# Patient Record
Sex: MALE | Race: OTHER | Employment: FULL TIME | URBAN - METROPOLITAN AREA
[De-identification: names, ages, dates, MRNs, and addresses within clinical notes are randomized per-mention and may not be internally consistent; named-entity substitution may affect disease eponyms.]

---

## 2017-03-24 ENCOUNTER — HOSPITAL ENCOUNTER (OUTPATIENT)
Age: 37
Discharge: HOME | End: 2017-03-24
Attending: EMERGENCY MEDICINE
Payer: COMMERCIAL

## 2017-03-24 VITALS
RESPIRATION RATE: 16 BRPM | OXYGEN SATURATION: 98 % | DIASTOLIC BLOOD PRESSURE: 73 MMHG | TEMPERATURE: 98 F | SYSTOLIC BLOOD PRESSURE: 143 MMHG | BODY MASS INDEX: 25 KG/M2 | HEART RATE: 75 BPM | WEIGHT: 138 LBS

## 2017-03-24 DIAGNOSIS — R42 VERTIGO: Primary | ICD-10-CM

## 2017-03-24 PROCEDURE — 99203 OFFICE O/P NEW LOW 30 MIN: CPT

## 2017-03-24 PROCEDURE — 99204 OFFICE O/P NEW MOD 45 MIN: CPT

## 2017-03-24 RX ORDER — MECLIZINE HYDROCHLORIDE 25 MG/1
25 TABLET ORAL ONCE
Status: COMPLETED | OUTPATIENT
Start: 2017-03-24 | End: 2017-03-24

## 2017-03-24 RX ORDER — MECLIZINE HYDROCHLORIDE 25 MG/1
25 TABLET ORAL 3 TIMES DAILY PRN
Qty: 20 TABLET | Refills: 0 | Status: SHIPPED | OUTPATIENT
Start: 2017-03-24 | End: 2019-11-29 | Stop reason: ALTCHOICE

## 2017-03-24 NOTE — ED INITIAL ASSESSMENT (HPI)
REPORTS DIZZINESS SINCE YESTERDAY. REPORTS ROOM SPINNING SENSATION. REPORTS EMESIS X 1 TODAY.  + NYSTAGMUS. DENIES SHORTNESS OF BREATH/CHEST PAIN.

## 2017-03-24 NOTE — ED PROVIDER NOTES
Patient Seen in: [facility]    History   Patient presents with:  Dizziness (neurologic)    Stated Complaint: Dizzyness    HPI    Patient presents with dizzy sensation. It is worse when he moves his head.   He had some diana well nourished adult lying in bed in no distress. Vital signs noted. Eye:  No scleral icterus. Eyelids appear normal, no lesions. Patient has nystagmus fatigable looking to the right.   No vertical or to the left tympanic membranes no redness no bulging

## 2017-03-30 ENCOUNTER — OFFICE VISIT (OUTPATIENT)
Dept: FAMILY MEDICINE CLINIC | Facility: CLINIC | Age: 37
End: 2017-03-30

## 2017-03-30 VITALS
HEART RATE: 71 BPM | SYSTOLIC BLOOD PRESSURE: 120 MMHG | BODY MASS INDEX: 25 KG/M2 | DIASTOLIC BLOOD PRESSURE: 77 MMHG | WEIGHT: 138 LBS

## 2017-03-30 DIAGNOSIS — H81.11 BENIGN PAROXYSMAL VERTIGO, RIGHT: Primary | ICD-10-CM

## 2017-03-30 PROCEDURE — 99213 OFFICE O/P EST LOW 20 MIN: CPT | Performed by: FAMILY MEDICINE

## 2017-03-30 PROCEDURE — 99212 OFFICE O/P EST SF 10 MIN: CPT | Performed by: FAMILY MEDICINE

## 2017-03-30 RX ORDER — PREDNISONE 20 MG/1
20 TABLET ORAL 2 TIMES DAILY
Qty: 10 TABLET | Refills: 0 | Status: SHIPPED | OUTPATIENT
Start: 2017-03-30 | End: 2017-04-04

## 2017-03-30 NOTE — PROGRESS NOTES
Friday when I woke up I opened my eyes the room was spinning. I went to wash up and it was the same. Went to urgent care. Meclizine didn't really help  \"Now I still have dizziness when I move my head rapidly. \"  The dizziness persists but it is not a

## 2019-11-29 ENCOUNTER — OFFICE VISIT (OUTPATIENT)
Dept: FAMILY MEDICINE CLINIC | Facility: CLINIC | Age: 39
End: 2019-11-29

## 2019-11-29 VITALS
TEMPERATURE: 99 F | SYSTOLIC BLOOD PRESSURE: 134 MMHG | DIASTOLIC BLOOD PRESSURE: 77 MMHG | WEIGHT: 133.81 LBS | HEART RATE: 58 BPM | BODY MASS INDEX: 23.71 KG/M2 | HEIGHT: 63 IN

## 2019-11-29 DIAGNOSIS — L20.9 ATOPIC DERMATITIS, UNSPECIFIED TYPE: Primary | ICD-10-CM

## 2019-11-29 PROCEDURE — 99213 OFFICE O/P EST LOW 20 MIN: CPT | Performed by: PHYSICIAN ASSISTANT

## 2019-11-29 RX ORDER — METHYLPREDNISOLONE 4 MG/1
TABLET ORAL
Qty: 1 KIT | Refills: 0 | Status: SHIPPED | OUTPATIENT
Start: 2019-11-29 | End: 2020-05-21 | Stop reason: ALTCHOICE

## 2019-11-29 RX ORDER — HYDROXYZINE HYDROCHLORIDE 25 MG/1
25 TABLET, FILM COATED ORAL EVERY 8 HOURS PRN
Qty: 30 TABLET | Refills: 0 | Status: SHIPPED | OUTPATIENT
Start: 2019-11-29 | End: 2020-05-21 | Stop reason: ALTCHOICE

## 2019-11-29 NOTE — PROGRESS NOTES
HPI:   Rash   This is a new problem. The current episode started in the past 7 days. The problem is unchanged. The affected locations include the abdomen, left arm and right arm. The rash is characterized by redness and itchiness.  It is unknown if there wa Types: Cigarettes      Smokeless tobacco: Never Used      Tobacco comment: 1 pack a month     Substance and Sexual Activity      Alcohol use:  Yes        Alcohol/week: 5.0 standard drinks        Types: 5 Standard drinks or equivalent per week        Frequen wheezing. Cardiovascular: Negative for chest pain and palpitations. Gastrointestinal: Negative for nausea, vomiting, abdominal pain, diarrhea, constipation, blood in stool and abdominal distention. Musculoskeletal: Negative for joint pain.    Skin: P Discussed plan of care with pt and pt is in agreement. All questions answered. Pt to call with questions or concerns. Encouraged to sign up for My Chart if not already registered.

## 2020-04-02 ENCOUNTER — NURSE TRIAGE (OUTPATIENT)
Dept: OTHER | Age: 40
End: 2020-04-02

## 2020-04-02 DIAGNOSIS — J02.9 SORE THROAT: Primary | ICD-10-CM

## 2020-04-02 PROCEDURE — 99213 OFFICE O/P EST LOW 20 MIN: CPT | Performed by: PHYSICIAN ASSISTANT

## 2020-04-02 NOTE — TELEPHONE ENCOUNTER
Action Requested: Summary for Provider     []  Critical Lab, Recommendations Needed  [] Need Additional Advice  []   FYI    [x]   Need Orders  [] Need Medications Sent to Pharmacy  []  Other     SUMMARY: onset 3-4 days, sore throat pain level 4/10 feels ir

## 2020-04-03 ENCOUNTER — TELEPHONE (OUTPATIENT)
Dept: FAMILY MEDICINE CLINIC | Facility: CLINIC | Age: 40
End: 2020-04-03

## 2020-04-03 NOTE — TELEPHONE ENCOUNTER
Tra Ash PA-C  Em Fm L[employer] Lpn/Cma 1 minute ago (8:48 AM)         Please provide employer note.          Documentation

## 2020-04-03 NOTE — TELEPHONE ENCOUNTER
Virtual/Telephone Check-In    Zulay Garg verbally consents to a Virtual/Telephone Check-In service on 04/03/20. Patient understands and accepts financial responsibility for any deductible, co-insurance and/or co-pays associated with this service.     Dur

## 2020-05-19 ENCOUNTER — NURSE TRIAGE (OUTPATIENT)
Dept: FAMILY MEDICINE CLINIC | Facility: CLINIC | Age: 40
End: 2020-05-19

## 2020-05-19 NOTE — TELEPHONE ENCOUNTER
Action Requested: Summary for Provider     []  Critical Lab, Recommendations Needed  [] Need Additional Advice  []   FYI    []   Need Orders  [] Need Medications Sent to Pharmacy  []  Other     SUMMARY: Per protocol advised : OV within 3 days    Future Philip

## 2020-05-21 ENCOUNTER — OFFICE VISIT (OUTPATIENT)
Dept: FAMILY MEDICINE CLINIC | Facility: CLINIC | Age: 40
End: 2020-05-21

## 2020-05-21 VITALS
RESPIRATION RATE: 16 BRPM | HEIGHT: 63 IN | BODY MASS INDEX: 24.1 KG/M2 | SYSTOLIC BLOOD PRESSURE: 116 MMHG | DIASTOLIC BLOOD PRESSURE: 78 MMHG | HEART RATE: 63 BPM | WEIGHT: 136 LBS

## 2020-05-21 DIAGNOSIS — R07.9 CHEST PAIN, UNSPECIFIED TYPE: Primary | ICD-10-CM

## 2020-05-21 DIAGNOSIS — L65.9 HAIR LOSS: ICD-10-CM

## 2020-05-21 DIAGNOSIS — R53.1 WEAKNESS: ICD-10-CM

## 2020-05-21 PROCEDURE — 3078F DIAST BP <80 MM HG: CPT | Performed by: PHYSICIAN ASSISTANT

## 2020-05-21 PROCEDURE — 3074F SYST BP LT 130 MM HG: CPT | Performed by: PHYSICIAN ASSISTANT

## 2020-05-21 PROCEDURE — 3008F BODY MASS INDEX DOCD: CPT | Performed by: PHYSICIAN ASSISTANT

## 2020-05-21 PROCEDURE — 99213 OFFICE O/P EST LOW 20 MIN: CPT | Performed by: PHYSICIAN ASSISTANT

## 2020-05-21 RX ORDER — FINASTERIDE 5 MG/1
5 TABLET, FILM COATED ORAL DAILY
Qty: 90 TABLET | Refills: 1 | Status: SHIPPED | OUTPATIENT
Start: 2020-05-21 | End: 2020-11-12

## 2020-05-21 NOTE — PROGRESS NOTES
HPI:   HPI  38year-old male is here in the office complaining of intermittent bilateral upper extremities and lower extremities weakness for the past 2 weeks. Patient also complaints of sore throat and chest pain sometimes for the past month.  The pain i use: Yes        Alcohol/week: 5.0 standard drinks        Types: 5 Standard drinks or equivalent per week        Frequency: 2-4 times a month        Drinks per session: 1 or 2        Binge frequency: Never        Comment: beer occasionally      Drug use: No abdominal pain, diarrhea, constipation, blood in stool and abdominal distention. Skin: Positive for hair loss. Negative for rash.   + muscle weakness Bl upper and lower extremities.      05/21/20  1012 05/21/20  1032   BP: 144/86 116/78   Pulse: 63    Res DIFFERENTIAL WITH PLATELET    LIPID PANEL    PSA SCREEN    HEMOGLOBIN A1C    TSH W REFLEX TO FREE T4    VITAMIN D, 25-HYDROXY    EKG 12-LEAD    Weakness        Relevant Orders    ALT (SGPT)    AST (SGOT)    BASIC METABOLIC PANEL (8)    CBC WITH DIFFERENTIA

## 2020-11-12 RX ORDER — FINASTERIDE 5 MG/1
5 TABLET, FILM COATED ORAL DAILY
Qty: 90 TABLET | Refills: 1 | Status: SHIPPED | OUTPATIENT
Start: 2020-11-12 | End: 2021-02-10

## 2021-10-12 ENCOUNTER — OFFICE VISIT (OUTPATIENT)
Dept: FAMILY MEDICINE CLINIC | Facility: CLINIC | Age: 41
End: 2021-10-12

## 2021-10-12 VITALS
HEART RATE: 65 BPM | SYSTOLIC BLOOD PRESSURE: 121 MMHG | DIASTOLIC BLOOD PRESSURE: 72 MMHG | BODY MASS INDEX: 24.8 KG/M2 | HEIGHT: 63 IN | WEIGHT: 140 LBS

## 2021-10-12 DIAGNOSIS — L65.9 HAIR LOSS: Primary | ICD-10-CM

## 2021-10-12 DIAGNOSIS — R42 VERTIGO: ICD-10-CM

## 2021-10-12 PROCEDURE — 3008F BODY MASS INDEX DOCD: CPT | Performed by: PHYSICIAN ASSISTANT

## 2021-10-12 PROCEDURE — 90686 IIV4 VACC NO PRSV 0.5 ML IM: CPT | Performed by: PHYSICIAN ASSISTANT

## 2021-10-12 PROCEDURE — 3074F SYST BP LT 130 MM HG: CPT | Performed by: PHYSICIAN ASSISTANT

## 2021-10-12 PROCEDURE — 90471 IMMUNIZATION ADMIN: CPT | Performed by: PHYSICIAN ASSISTANT

## 2021-10-12 PROCEDURE — 99213 OFFICE O/P EST LOW 20 MIN: CPT | Performed by: PHYSICIAN ASSISTANT

## 2021-10-12 PROCEDURE — 3078F DIAST BP <80 MM HG: CPT | Performed by: PHYSICIAN ASSISTANT

## 2021-10-12 RX ORDER — MECLIZINE HYDROCHLORIDE 25 MG/1
25 TABLET ORAL 3 TIMES DAILY PRN
Qty: 30 TABLET | Refills: 3 | Status: SHIPPED | OUTPATIENT
Start: 2021-10-12

## 2021-10-12 RX ORDER — FINASTERIDE 5 MG/1
5 TABLET, FILM COATED ORAL DAILY
Qty: 90 TABLET | Refills: 3 | Status: SHIPPED | OUTPATIENT
Start: 2021-10-12 | End: 2022-10-07

## 2021-10-12 NOTE — PROGRESS NOTES
HPI:   HPI  42year-old male is here in the office complaining of intermittent vertigo. Patient states that the vertigo often occurs when he is on the airplane. He feels \" the room spinning around\".   Patient denies of chest pain, SOB, N/V/C/D, fever, diz Sexual activity: Not on file    Other Topics      Concerns:         Service: Not Asked        Blood Transfusions: Not Asked        Caffeine Concern: Yes          1 cup coffdee daily        Occupational Exposure: Not Asked        Hobby Hazards: Not chills, fatigue and fever. HENT: Negative for congestion, ear discharge, ear pain, postnasal drip, rhinorrhea, sinus pressure, sinus pain and sore throat. Respiratory: Negative for cough, chest tightness, shortness of breath and wheezing.     Garcia Houston Assessment and Plan[de-identified]     Problem List Items Addressed This Visit        Integumentary    Hair loss - Primary    Relevant Medications    finasteride 5 MG Oral Tab       Other    Vertigo    Relevant Medications    meclizine 25 MG Oral Tab        Disc

## 2024-05-31 ENCOUNTER — APPOINTMENT (OUTPATIENT)
Dept: GENERAL RADIOLOGY | Age: 44
End: 2024-05-31
Attending: PHYSICIAN ASSISTANT
Payer: COMMERCIAL

## 2024-05-31 ENCOUNTER — HOSPITAL ENCOUNTER (OUTPATIENT)
Age: 44
Discharge: HOME | End: 2024-05-31
Payer: COMMERCIAL

## 2024-05-31 VITALS
TEMPERATURE: 99 F | HEART RATE: 100 BPM | SYSTOLIC BLOOD PRESSURE: 151 MMHG | RESPIRATION RATE: 16 BRPM | DIASTOLIC BLOOD PRESSURE: 81 MMHG | OXYGEN SATURATION: 100 %

## 2024-05-31 DIAGNOSIS — R07.2 PRECORDIAL CHEST PAIN: Primary | ICD-10-CM

## 2024-05-31 LAB
#MXD IC: 0.4 X10ˆ3/UL (ref 0.1–1)
BASOPHILS # BLD AUTO: 0.03 X10(3) UL (ref 0–0.2)
BASOPHILS NFR BLD AUTO: 0.3 %
BUN BLD-MCNC: 17 MG/DL (ref 7–18)
CHLORIDE BLD-SCNC: 105 MMOL/L (ref 98–112)
CO2 BLD-SCNC: 23 MMOL/L (ref 21–32)
CREAT BLD-MCNC: 0.9 MG/DL
DDIMER WHOLE BLOOD: <200 NG/ML DDU (ref ?–400)
DEPRECATED RDW RBC AUTO: 41.2 FL (ref 35.1–46.3)
EGFRCR SERPLBLD CKD-EPI 2021: 107 ML/MIN/1.73M2 (ref 60–?)
EOSINOPHIL # BLD AUTO: 0 X10(3) UL (ref 0–0.7)
EOSINOPHIL NFR BLD AUTO: 0 %
ERYTHROCYTE [DISTWIDTH] IN BLOOD BY AUTOMATED COUNT: 12.6 % (ref 11–15)
GLUCOSE BLD-MCNC: 104 MG/DL (ref 70–99)
HCT VFR BLD AUTO: 47.3 %
HCT VFR BLD AUTO: 48 %
HCT VFR BLD CALC: 53 %
HGB BLD-MCNC: 16.4 G/DL
HGB BLD-MCNC: 16.8 G/DL
IMM GRANULOCYTES # BLD AUTO: 0.02 X10(3) UL (ref 0–1)
IMM GRANULOCYTES NFR BLD: 0.2 %
ISTAT IONIZED CALCIUM FOR CHEM 8: 1.19 MMOL/L (ref 1.12–1.32)
LYMPHOCYTES # BLD AUTO: 1.36 X10(3) UL (ref 1–4)
LYMPHOCYTES # BLD AUTO: 1.7 X10ˆ3/UL (ref 1–4)
LYMPHOCYTES NFR BLD AUTO: 12.7 %
LYMPHOCYTES NFR BLD AUTO: 15.7 %
MCH RBC QN AUTO: 30.9 PG (ref 26–34)
MCH RBC QN AUTO: 31.6 PG (ref 26–34)
MCHC RBC AUTO-ENTMCNC: 34.2 G/DL (ref 31–37)
MCHC RBC AUTO-ENTMCNC: 35.5 G/DL (ref 31–37)
MCV RBC AUTO: 88.9 FL
MCV RBC AUTO: 90.4 FL (ref 80–100)
MIXED CELL %: 3.5 %
MONOCYTES # BLD AUTO: 0.47 X10(3) UL (ref 0.1–1)
MONOCYTES NFR BLD AUTO: 4.4 %
NEUTROPHILS # BLD AUTO: 8.8 X10ˆ3/UL (ref 1.5–7.7)
NEUTROPHILS # BLD AUTO: 8.85 X10 (3) UL (ref 1.5–7.7)
NEUTROPHILS # BLD AUTO: 8.85 X10(3) UL (ref 1.5–7.7)
NEUTROPHILS NFR BLD AUTO: 80.8 %
NEUTROPHILS NFR BLD AUTO: 82.4 %
PLATELET # BLD AUTO: 198 10(3)UL (ref 150–450)
PLATELETS.RETICULATED NFR BLD AUTO: 16.5 % (ref 0–7)
POTASSIUM BLD-SCNC: 4.7 MMOL/L (ref 3.6–5.1)
RBC # BLD AUTO: 5.31 X10ˆ6/UL
RBC # BLD AUTO: 5.32 X10(6)UL
SODIUM BLD-SCNC: 138 MMOL/L (ref 136–145)
TROPONIN I BLD-MCNC: <0.02 NG/ML
WBC # BLD AUTO: 10.7 X10(3) UL (ref 4–11)
WBC # BLD AUTO: 10.9 X10ˆ3/UL (ref 4–11)

## 2024-05-31 PROCEDURE — 93010 ELECTROCARDIOGRAM REPORT: CPT

## 2024-05-31 PROCEDURE — 99215 OFFICE O/P EST HI 40 MIN: CPT

## 2024-05-31 PROCEDURE — 99204 OFFICE O/P NEW MOD 45 MIN: CPT

## 2024-05-31 PROCEDURE — 84484 ASSAY OF TROPONIN QUANT: CPT

## 2024-05-31 PROCEDURE — 80047 BASIC METABLC PNL IONIZED CA: CPT

## 2024-05-31 PROCEDURE — 85378 FIBRIN DEGRADE SEMIQUANT: CPT | Performed by: PHYSICIAN ASSISTANT

## 2024-05-31 PROCEDURE — 93005 ELECTROCARDIOGRAM TRACING: CPT

## 2024-05-31 PROCEDURE — 85025 COMPLETE CBC W/AUTO DIFF WBC: CPT | Performed by: PHYSICIAN ASSISTANT

## 2024-05-31 PROCEDURE — 36415 COLL VENOUS BLD VENIPUNCTURE: CPT

## 2024-05-31 PROCEDURE — 71046 X-RAY EXAM CHEST 2 VIEWS: CPT | Performed by: PHYSICIAN ASSISTANT

## 2024-05-31 NOTE — ED PROVIDER NOTES
Chief Complaint   Patient presents with    Chest Pain       HPI:     Merlin Matthew is a 44 year old male who presents for evaluation of left-sided lower chest discomfort onset yesterday afternoon around 3 PM.  Patient states pain is intermittent lasting a few hours at a time maximum pain 3 out of 10.  Notes pain intermittent .  No aggravating or alleviating factors.  Denies injury or trauma or current or recent URI symptoms.  No OTC medications since onset.  Cardiac risk factors: Family history, father MI age 75.  Notes  travel to [place] within the last 3 to 4 months.  Denies self or family history of hypercoagulable state.  Denies active headache dizziness vision changes neck pain active shortness of breath cough abdominal pain vomiting diarrhea dysuria hematuria upper lower extremity weakness numbness or swelling      PFSH    PFSH asessment screens reviewed and agree.  Nurses notes reviewed I agree with documentation.    Family History   Problem Relation Age of Onset    Diabetes Father     Diabetes Paternal Grandfather     Diabetes Paternal Grandmother      Family history reviewed with patient/caregiver and is not pertinent to presenting problem.  Social History     Socioeconomic History    Marital status:      Spouse name: Not on file    Number of children: Not on file    Years of education: Not on file    Highest education level: Not on file   Occupational History    Not on file   Tobacco Use    Smoking status: Some Days     Types: Cigarettes    Smokeless tobacco: Never    Tobacco comments:     1 pack a month    Vaping Use    Vaping status: Never Used   Substance and Sexual Activity    Alcohol use: Yes     Alcohol/week: 5.0 standard drinks of alcohol     Types: 5 Standard drinks or equivalent per week     Comment: beer occasionally    Drug use: No    Sexual activity: Not on file   Other Topics Concern     Service Not Asked    Blood Transfusions Not Asked    Caffeine Concern Yes     Comment: 1 cup  alexander daily    Occupational Exposure Not Asked    Hobby Hazards Not Asked    Sleep Concern Not Asked    Stress Concern Not Asked    Weight Concern Not Asked    Special Diet Not Asked    Back Care Not Asked    Exercise Not Asked    Bike Helmet Not Asked    Seat Belt Not Asked    Self-Exams Not Asked   Social History Narrative    Not on file     Social Determinants of Health     Financial Resource Strain: Not on file   Food Insecurity: Not on file   Transportation Needs: Not on file   Physical Activity: Not on file   Stress: Not on file   Social Connections: Not on file   Housing Stability: Not on file         ROS:   Positive for stated complaint: Chest pain.  All other systems reviewed and negative except as noted above.  Constitutional and Vital Signs Reviewed.      Physical Exam:     Findings:    /81   Pulse 100   Temp 98.9 °F (37.2 °C) (Temporal)   Resp 16   SpO2 100%   GENERAL: well developed, well nourished, well hydrated, no distress  SKIN: good skin turgor, no obvious rashes  NECK: No cervical or paracervical tenderness.  No JVD.  Supple, no adenopathy  CARDIO: No direct reproducible chest tenderness or lesion.  RRR without murmur  EXTREMITIES: no cyanosis or edema. LAW without difficulty  GI: soft, non-tender, normal bowel sounds  HEAD: normocephalic, atraumatic  EYES: sclera non icteric bilateral, conjunctiva clear  EARS: TMs clear bilaterally. Canals clear.  NOSE: No rhinorrhea.  MMM.  Nasal turbinates: pink, normal mucosa  THROAT: clear, without exudates, uvula midline, and airway patent  LUNGS: No retractions.  Clear to auscultation bilaterally; no rales, rhonchi, or wheezes  NEURO: No focal deficits  PSYCH: Alert and oriented x3.  Answering questions appropriately.  Mood appropriate.    MDM/Assessment/Plan:   Orders for this encounter:    Orders Placed This Encounter    CBC w/Auto Diff     Order Specific Question:   Release to patient     Answer:   Immediate    XR CHEST PA + LAT CHEST  (CPT=71046)     Order Specific Question:   What is the Relevant Clinical Indication / Reason for Exam?     Answer:   rt shoulder/chest pain, shrtness of breath     Order Specific Question:   Release to patient     Answer:   Immediate    POCT CBC     Order Specific Question:   Release to patient     Answer:   Immediate    D-Dimer,Triage     Order Specific Question:   Release to patient     Answer:   Immediate    EKG 12 Lead     Order Specific Question:   Release to patient     Answer:   Immediate    POCT ISTAT chem8 cartridge    ISTAT Troponin    iStat (Troponin)    iStat (Chem 8)       Labs performed this visit:  Recent Results (from the past 10 hour(s))   POCT CBC    Collection Time: 05/31/24  5:44 PM   Result Value Ref Range    WBC IC 10.9 4.0 - 11.0 x10ˆ3/uL    RBC IC 5.31 4.30 - 5.70 X10ˆ6/uL    HGB IC 16.4 13.0 - 17.5 g/dL    HCT IC 48.0 39.0 - 53.0 %    MCV IC 90.4 80.0 - 100.0 fL    MCH IC 30.9 26.0 - 34.0 pg    MCHC IC 34.2 31.0 - 37.0 g/dL    PLT IC      # Neutrophil 8.8 (H) 1.5 - 7.7 X10ˆ3/uL    # Lymphocyte 1.7 1.0 - 4.0 X10ˆ3/uL    # Mixed Cells 0.4 0.1 - 1.0 X10ˆ3/uL    Neutrophil % 80.8 %    Lymphocyte % 15.7 %    Mixed Cell % 3.5 %   D-Dimer,Triage    Collection Time: 05/31/24  5:44 PM   Result Value Ref Range    D-Dimer DDU <200 <400 ng/mL DDU   EKG 12 Lead    Collection Time: 05/31/24  5:46 PM   Result Value Ref Range    Ventricular rate 81 BPM    Atrial rate 81 BPM    P-R Interval 158 ms    QRS Duration 90 ms    Q-T Interval 356 ms    QTC Calculation (Bezet) 413 ms    P Axis 28 degrees    R Axis 40 degrees    T Axis 40 degrees   POCT ISTAT chem8 cartridge    Collection Time: 05/31/24  5:54 PM   Result Value Ref Range    ISTAT Sodium 138 136 - 145 mmol/L    ISTAT BUN 17 7 - 18 mg/dL    ISTAT Potassium 4.7 3.6 - 5.1 mmol/L    ISTAT Chloride 105 98 - 112 mmol/L    ISTAT Ionized Calcium 1.19 1.12 - 1.32 mmol/L    ISTAT Hematocrit 53 37 - 53 %    ISTAT Glucose 104 (H) 70 - 99 mg/dL    ISTAT TCO2 23 21 -  32 mmol/L    ISTAT Creatinine 0.90 0.70 - 1.30 mg/dL    eGFR-Cr 107 >=60 mL/min/1.73m2   ISTAT Troponin    Collection Time: 05/31/24  5:56 PM   Result Value Ref Range    ISTAT Troponin <0.02 <0.045 ng/mL ng/mL       MDM:  EKG 81 bpm normal sinus rhythm no ST elevation or depression with a QT of 356.    Chest x-ray without noted infiltrate cardiomegaly or pneumothorax.  Patient metabolic and cardiac profile unremarkable, heart score: 1.  D-dimer negative with slight tachycardia on arrival.  Patient without any exam changes or subjective pain elevation during assessment.  Patient agrees to readdress this outpatient by recommendation over the days ahead versus go to the ER for acute changes in intensity location and duration by recommendation.  Happy with plan of care.  Differential including but not limited to: Precordial chest pain.     Dr Barrios notified.     Diagnosis:    ICD-10-CM    1. Precordial chest pain  R07.2           All results reviewed and discussed with patient.  See AVS for detailed discharge instructions for your condition today.    Follow Up with:  Clarice Melchor MD  [address] [place] [address]  [address]  [facility] [address]  [phone]    Schedule an appointment as soon as possible for a visit in 3 days  As needed; GO TO ER FOR BREAKTHROUGH CHANGES AS INSTRUCTED.

## 2024-05-31 NOTE — ED INITIAL ASSESSMENT (HPI)
Onset of right chest pain near axilla with SOB last night. Some fatigue. No fever. No cough or congestion. No trauma.

## 2024-06-01 LAB
ATRIAL RATE: 81 BPM
P AXIS: 28 DEGREES
P-R INTERVAL: 158 MS
Q-T INTERVAL: 356 MS
QRS DURATION: 90 MS
QTC CALCULATION (BEZET): 413 MS
R AXIS: 40 DEGREES
T AXIS: 40 DEGREES
VENTRICULAR RATE: 81 BPM

## 2024-06-04 ENCOUNTER — OFFICE VISIT (OUTPATIENT)
Dept: FAMILY MEDICINE CLINIC | Facility: CLINIC | Age: 44
End: 2024-06-04

## 2024-06-04 VITALS
HEART RATE: 62 BPM | RESPIRATION RATE: 17 BRPM | BODY MASS INDEX: 25.49 KG/M2 | DIASTOLIC BLOOD PRESSURE: 77 MMHG | WEIGHT: 143.88 LBS | SYSTOLIC BLOOD PRESSURE: 125 MMHG | HEIGHT: 63 IN

## 2024-06-04 DIAGNOSIS — R07.89 CHEST PRESSURE: Primary | ICD-10-CM

## 2024-06-04 PROCEDURE — 99214 OFFICE O/P EST MOD 30 MIN: CPT | Performed by: FAMILY MEDICINE

## 2024-06-04 NOTE — PROGRESS NOTES
Merlin Matthew is a 44 year old male.  HPI:   Patient reports started to develop pain in left chest that radiated to left upper extremity, symptoms progressively got worse, initially his pain was radiated 2-3/10 but as the day progressed he was feeling more anxious and pain got worse and he also was presenting with shortness of breath, was seen in urgent care and initial evaluation was overall unremarkable, he feels my myalgia at this time, no shortness of breath or other symptoms noted.   No current outpatient medications on file.      Past Medical History:    Lipid screening    per NG      Social History:  Social History     Socioeconomic History    Marital status:    Tobacco Use    Smoking status: Some Days     Types: Cigarettes    Smokeless tobacco: Never    Tobacco comments:     1 pack a month    Vaping Use    Vaping status: Never Used   Substance and Sexual Activity    Alcohol use: Yes     Alcohol/week: 5.0 standard drinks of alcohol     Types: 5 Standard drinks or equivalent per week     Comment: beer occasionally    Drug use: No   Other Topics Concern    Caffeine Concern Yes     Comment: 1 cup coffdee daily          EXAM:   /77   Pulse 62   Resp 17   Ht 5' 3\" (1.6 m)   Wt 143 lb 14.4 oz (65.3 kg)   BMI 25.49 kg/m²     Physical Exam  Vitals and nursing note reviewed.   Constitutional:       General: He is not in acute distress.     Appearance: Normal appearance. He is not ill-appearing.   HENT:      Head: Normocephalic and atraumatic.   Cardiovascular:      Rate and Rhythm: Normal rate and regular rhythm.   Pulmonary:      Effort: Pulmonary effort is normal.      Breath sounds: Normal breath sounds.   Musculoskeletal:      Cervical back: Normal range of motion.   Neurological:      General: No focal deficit present.      Mental Status: He is alert and oriented to person, place, and time. Mental status is at baseline.   Psychiatric:         Mood and Affect: Mood normal.            ASSESSMENT  AND PLAN:   1. Chest pressure  Urgent care note reviewed , he does have family history of MI in his father, will complete evaluation as noted below, complete calcium scoring to clarify cardiovascular risk  - Comp Metabolic Panel (14) [E]; Future  - Lipid Panel [E]; Future  - CT CALCIUM SCORING; Future       The patient indicates understanding of these issues and agrees to the plan.      The above note was creating using Dragon speech recognition technology. Please excuse any typos.

## 2024-06-12 ENCOUNTER — LAB ENCOUNTER (OUTPATIENT)
Dept: LAB | Age: 44
End: 2024-06-12
Attending: FAMILY MEDICINE
Payer: COMMERCIAL

## 2024-06-12 DIAGNOSIS — R07.89 CHEST PRESSURE: ICD-10-CM

## 2024-06-12 LAB
ALBUMIN SERPL-MCNC: 4.5 G/DL (ref 3.2–4.8)
ALBUMIN/GLOB SERPL: 1.6 {RATIO} (ref 1–2)
ALP LIVER SERPL-CCNC: 71 U/L
ALT SERPL-CCNC: 23 U/L
ANION GAP SERPL CALC-SCNC: 3 MMOL/L (ref 0–18)
AST SERPL-CCNC: 23 U/L (ref ?–34)
BILIRUB SERPL-MCNC: 0.7 MG/DL (ref 0.3–1.2)
BUN BLD-MCNC: 18 MG/DL (ref 9–23)
BUN/CREAT SERPL: 19.8 (ref 10–20)
CALCIUM BLD-MCNC: 8.9 MG/DL (ref 8.7–10.4)
CHLORIDE SERPL-SCNC: 109 MMOL/L (ref 98–112)
CHOLEST SERPL-MCNC: 217 MG/DL (ref ?–200)
CO2 SERPL-SCNC: 28 MMOL/L (ref 21–32)
CREAT BLD-MCNC: 0.91 MG/DL
EGFRCR SERPLBLD CKD-EPI 2021: 106 ML/MIN/1.73M2 (ref 60–?)
FASTING PATIENT LIPID ANSWER: YES
FASTING STATUS PATIENT QL REPORTED: YES
GLOBULIN PLAS-MCNC: 2.8 G/DL (ref 2–3.5)
GLUCOSE BLD-MCNC: 91 MG/DL (ref 70–99)
HDLC SERPL-MCNC: 42 MG/DL (ref 40–59)
LDLC SERPL CALC-MCNC: 152 MG/DL (ref ?–100)
NONHDLC SERPL-MCNC: 175 MG/DL (ref ?–130)
OSMOLALITY SERPL CALC.SUM OF ELEC: 291 MOSM/KG (ref 275–295)
POTASSIUM SERPL-SCNC: 4.6 MMOL/L (ref 3.5–5.1)
PROT SERPL-MCNC: 7.3 G/DL (ref 5.7–8.2)
SODIUM SERPL-SCNC: 140 MMOL/L (ref 136–145)
TRIGL SERPL-MCNC: 127 MG/DL (ref 30–149)
VLDLC SERPL CALC-MCNC: 24 MG/DL (ref 0–30)

## 2024-06-12 PROCEDURE — 36415 COLL VENOUS BLD VENIPUNCTURE: CPT

## 2024-06-12 PROCEDURE — 80053 COMPREHEN METABOLIC PANEL: CPT

## 2024-06-12 PROCEDURE — 80061 LIPID PANEL: CPT

## 2024-06-18 ENCOUNTER — HOSPITAL ENCOUNTER (OUTPATIENT)
Dept: CT IMAGING | Age: 44
Discharge: HOME | End: 2024-06-18
Attending: FAMILY MEDICINE

## 2024-06-18 DIAGNOSIS — R07.89 CHEST PRESSURE: ICD-10-CM

## 2024-06-18 NOTE — PROGRESS NOTES
Date of Service 6/18/2024    SPENSER AGEE  Date of Birth 1/25/1980    Patient Age: 44 year old    PCP: Clara Burr MD  [address] [facility] [address]  [place] [address]    Heart Scan Consult  Preliminary Heart Scan Score: 0    Previous Screening  Heart Scan Completed Previously: No                   Risk Factors  Personal Risk Factors  Alterable Risk Factors: Abnormal Cholesterol      Body Mass Index  BMI 25    Blood Pressure  /77 no med.  (Normal =< 120/80,  Elevated = 120-129/ >80,  High Stage1 130-139/80-89 , Stage2 >140/>90)    Lipid Profile  Cholesterol: 217, done on 6/12/2024.  HDL Cholesterol: 42, done on 6/12/2024.  LDL Cholesterol: 152, done on 6/12/2024.  TriGlycerides 127, done on 6/12/2024.  No med.  Cholesterol Goals  Value   Total  =< 200   HDL  = > 45 Men = > 55 Women   LDL   =< 100   Triglycerides  =< 150       Glucose and Hemoglobin A1C  Lab Results   Component Value Date    GLU 91 06/12/2024     (Normal Fasting Glucose < 100mg/dl )    Nurse Review  Risk factor information and results reviewed with Nurse: Yes    Recommended Follow Up:  Consult your physician regarding:: Final Heart Scan Report;Discuss potential for Incidental Finding      Recommendations for Change:  Nutrition Changes: Low Saturated Fat;Increase Fiber    Cholesterol Modification (goal of therapy depends upon your risk): Decrease LDL (Lousy/Bad) Ideal <100    Exercise: Enhance Current Program    Smoking Cessation: > 1 Year Ago    Weight Management: Maintain Current Weight    Stress Management: Adopt Stress Management Techniques    Repeat Heart Scan: 5 years if Calcium Score is 0.0;Discuss with your Physician              [place] Recommended Resources:  Recommended Resources: Upcoming Classes, Medical Services and Health Library [email or web address]            Jeanie DODGE RN        Please Contact the Nurse Heart Line with any Questions or Concerns [phone].

## 2024-08-22 ENCOUNTER — OFFICE VISIT (OUTPATIENT)
Dept: FAMILY MEDICINE CLINIC | Facility: CLINIC | Age: 44
End: 2024-08-22
Payer: COMMERCIAL

## 2024-08-22 VITALS
HEART RATE: 69 BPM | WEIGHT: 140 LBS | BODY MASS INDEX: 24.8 KG/M2 | HEIGHT: 63 IN | DIASTOLIC BLOOD PRESSURE: 72 MMHG | SYSTOLIC BLOOD PRESSURE: 109 MMHG

## 2024-08-22 DIAGNOSIS — E78.2 MIXED HYPERLIPIDEMIA: ICD-10-CM

## 2024-08-22 DIAGNOSIS — Z12.11 SCREENING FOR MALIGNANT NEOPLASM OF COLON: ICD-10-CM

## 2024-08-22 DIAGNOSIS — Z12.5 SCREENING FOR MALIGNANT NEOPLASM OF PROSTATE: ICD-10-CM

## 2024-08-22 DIAGNOSIS — E55.9 VITAMIN D DEFICIENCY: ICD-10-CM

## 2024-08-22 DIAGNOSIS — Z00.00 ROUTINE GENERAL MEDICAL EXAMINATION AT A HEALTH CARE FACILITY: Primary | ICD-10-CM

## 2024-08-22 DIAGNOSIS — K21.9 GASTROESOPHAGEAL REFLUX DISEASE WITHOUT ESOPHAGITIS: ICD-10-CM

## 2024-08-22 PROBLEM — L20.9 ATOPIC DERMATITIS: Status: RESOLVED | Noted: 2019-11-29 | Resolved: 2024-08-22

## 2024-08-22 PROCEDURE — 99396 PREV VISIT EST AGE 40-64: CPT | Performed by: PHYSICIAN ASSISTANT

## 2024-08-22 NOTE — PROGRESS NOTES
HPI:   Merlin Matthew is a 44 year old male who presents for an Annual Health Visit.   The patient complains of intermittent heart burn for the past week.  The patient denies chest pain, SOB, N/V/C/D, fever, dizziness, syncope, and abdominal pain. There are no other concerns today.        Allergies:   No Known Allergies    CURRENT MEDICATIONS   Current Outpatient Medications   Medication Sig Dispense Refill    omeprazole 20 MG Oral Capsule Delayed Release Take 1 capsule (20 mg total) by mouth daily as needed. 90 capsule 0      HISTORICAL INFORMATION   Past Medical History:    Lipid screening    per NG      History reviewed. No pertinent surgical history.   Family History   Problem Relation Age of Onset    Diabetes Mother     Diabetes Father     Heart Attack Father     Diabetes Paternal Grandmother     Diabetes Paternal Grandfather       SOCIAL HISTORY   Social History     Socioeconomic History    Marital status:    Tobacco Use    Smoking status: Never     Passive exposure: Never    Smokeless tobacco: Never    Tobacco comments:     1 pack a month    Vaping Use    Vaping status: Never Used   Substance and Sexual Activity    Alcohol use: Yes     Alcohol/week: 5.0 standard drinks of alcohol     Types: 5 Standard drinks or equivalent per week     Comment: beer occasionally    Drug use: No   Other Topics Concern    Caffeine Concern Yes     Comment: 1 cup coffdee daily     Social History     Social History Narrative    Not on file        REVIEW OF SYSTEMS:     Review of Systems   Constitutional: Negative.    HENT: Negative.     Eyes: Negative.    Respiratory: Negative.     Cardiovascular: Negative.    Gastrointestinal: Negative.    Genitourinary: Negative.    Musculoskeletal: Negative.    Skin: Negative.    Neurological: Negative.    Psychiatric/Behavioral: Negative.           EXAM:   /72   Pulse 69   Ht 5' 3\" (1.6 m)   Wt 140 lb (63.5 kg)   BMI 24.80 kg/m²    Wt Readings from Last 6 Encounters:    08/22/24 140 lb (63.5 kg)   06/04/24 143 lb 14.4 oz (65.3 kg)   10/12/21 140 lb (63.5 kg)   05/21/20 136 lb (61.7 kg)   11/29/19 133 lb 12.8 oz (60.7 kg)   03/30/17 138 lb (62.6 kg)     Body mass index is 24.8 kg/m².    Physical Exam  Vitals reviewed.   Constitutional:       Appearance: Normal appearance. He is well-developed and normal weight.   HENT:      Head: Normocephalic and atraumatic.      Right Ear: Tympanic membrane, ear canal and external ear normal. There is no impacted cerumen.      Left Ear: Tympanic membrane, ear canal and external ear normal. There is no impacted cerumen.      Nose: Nose normal.      Mouth/Throat:      Mouth: Mucous membranes are moist.      Pharynx: Oropharynx is clear. No oropharyngeal exudate or posterior oropharyngeal erythema.   Eyes:      General:         Right eye: No discharge.         Left eye: No discharge.      Conjunctiva/sclera: Conjunctivae normal.   Cardiovascular:      Rate and Rhythm: Normal rate and regular rhythm.      Heart sounds: Normal heart sounds.   Pulmonary:      Effort: Pulmonary effort is normal.      Breath sounds: Normal breath sounds.   Abdominal:      General: Abdomen is flat. Bowel sounds are normal. There is no distension.      Palpations: Abdomen is soft.      Tenderness: There is no abdominal tenderness. There is no right CVA tenderness or left CVA tenderness.   Musculoskeletal:         General: Normal range of motion.      Cervical back: Normal range of motion and neck supple.   Skin:     Findings: No rash.   Neurological:      Mental Status: He is alert and oriented to person, place, and time.   Psychiatric:         Mood and Affect: Mood normal.         Behavior: Behavior normal.         Thought Content: Thought content normal.         Judgment: Judgment normal.          ASSESSMENT AND PLAN:   Merlin was seen today for routine physical.    Diagnoses and all orders for this visit:    Routine general medical examination at a health care  facility  -     Lipid Panel; Future  -     PSA Total, Screen; Future  -     TSH W Reflex To Free T4; Future  -     Vitamin D; Future    Screening for malignant neoplasm of colon  -     Gastro GI Telephone Colon Screen; Future    Vitamin D deficiency  -     Vitamin D; Future    Screening for malignant neoplasm of prostate  -     PSA Total, Screen; Future    Mixed hyperlipidemia  -     CT CALCIUM SCORING; Future    Gastroesophageal reflux disease without esophagitis  -     omeprazole 20 MG Oral Capsule Delayed Release; Take 1 capsule (20 mg total) by mouth daily as needed.  -     Gastro Referral - In Network    Overall health discussed, exercise/activity appropriate for age and health status, heathy diety, preventive care, and upcoming screening discussed. Routine labs ordered.    There are no Patient Instructions on file for this visit.    The patient indicates understanding of these issues and agrees to the plan.    Problem List:  Patient Active Problem List   Diagnosis    Hair loss    Vertigo       Earnest Butler PA-C  8/22/2024  3:53 PM

## 2024-10-02 DIAGNOSIS — K21.9 GASTROESOPHAGEAL REFLUX DISEASE WITHOUT ESOPHAGITIS: ICD-10-CM

## 2024-10-04 NOTE — TELEPHONE ENCOUNTER
Please review. Protocol Pass    Original rx written 90 with no refills.  Rx is pended, is refill appropriate?     Requested Prescriptions   Pending Prescriptions Disp Refills    omeprazole 20 MG Oral Capsule Delayed Release 90 capsule 0     Sig: Take 1 capsule (20 mg total) by mouth daily as needed.       Gastrointestional Medication Protocol Passed - 10/2/2024 11:52 AM        Passed - In person appointment or virtual visit in the past 12 mos or appointment in next 3 mos     Recent Outpatient Visits              1 month ago Routine general medical examination at a health care facility    [facility] [place] Earnest Butler PA-C    Office Visit    4 months ago Chest pressure    [facility] [address], [place] Clara Burr MD    Office Visit    2 years ago Hair loss    [facility] [place] Earnest Butler, PA-C    Office Visit    4 years ago Chest pain, unspecified type    [facility] [address], [place] Earnest Butler, PA-C    Office Visit    4 years ago Atopic dermatitis, unspecified type    [facility] [address], [place] Earnest Butler, PA-C    Office Visit                               Recent Outpatient Visits              1 month ago Routine general medical examination at a health care facility    [facility] [place] Earnest Butler, PA-C    Office Visit    4 months ago Chest pressure    [facility] [address], [place] Clara Wright MD    Office Visit    2 years ago Hair loss    [facility] [address], [place] Earnest Butler, PA-C    Office Visit    4 years ago Chest pain, unspecified type    [facility] [address], [place] Solomon Butlerxabdullahi, PA-C    Office Visit    4 years ago Atopic dermatitis, unspecified type    [facility],  [place] Nguyen, Minhxuyen, PA-C    Office Visit

## (undated) NOTE — LETTER
04/21/21    Meriln Matthew  [address]      Dear Travis Del Toro,    Our records indicate that you have outstanding lab work and or testing that was ordered for you and has not yet been completed:  Orders Placed This Encounter      ALT(SGPT) [E

## (undated) NOTE — LETTER
[facility] [place]  [address] [facility] [address]  Dept: [phone]  Dept Fax: [phone] 49: [phone]      March 24, 2017    Patient: Chad Martin   Date of Visit: 3/24/2017       To Whom It May Concern:    Merlin MUNOZ

## (undated) NOTE — LETTER
08/20/20        Merlin Matthew  [address]      [address] registros indican que tiene análisis clínicos pendientes y/o pruebas que se ordenaron en maddox nombre que no se harper completado.     Para brindarle la mejor a

## (undated) NOTE — MR AVS SNAPSHOT
[facility]  [address] [place] [address], [address] [place] [address]               Thank you for choosing us for your health care visit with Mayito Hector. DO Allie.   We are glad to serve you and happy to provide you with this summary information, go to [email or web address] and click on the Sign Up Now link in the [employer] box. Enter your [employer] Activation Code exactly as it appears below along with your Zip Code and Date of Birth to complete the sign-up process.  If you do You don’t need to join a gym. Home exercises work great.  Put more priority on exercise in your life                    Visit [facility] online at  [email or web address]

## (undated) NOTE — ED AVS SNAPSHOT
[facility] in [address].  [address]    Phone:  [phone]    Fax:  [address]   MRN: [id number]    Department:  [facility] in [address] [place] [address]   Date of Visit:  3/24 Insurance plans vary and the physician(s) referred by the Immediate Care may not be covered by your plan. It is possible that the physician may not participate in your health insurance plan.   This may result in a lower benefit level being available to yo CARE PHYSICIAN AT ONCE OR GO TO THE EMERGENCY DEPARTMENT. If you have been prescribed any medication(s), please fill your prescription right away and begin taking the medication(s) as directed.   If you believe that any of the medications or instructions - If you have concerns related to behavioral health issues or thoughts of harming yourself, contact [address] [place] [address] at [phone].     - If you don’t have insurance, Kurt Dixon has partnered with [facility] [employer] [employer]

## (undated) NOTE — LETTER
02/23/21        Merlin Matthew  [address]      Dear Jimena Carr,    Our records indicate that you have outstanding lab work and or testing that was ordered for you and has not yet been completed:  Orders Placed This Encounter      ALT(SG

## (undated) NOTE — LETTER
April 3, 2020    Patient: Justine Solorzano   YOB: 1980     Dear Employer,  At [facility], we are taking special precautions and doing everything we can to prevent the spread of COVID-19 (coronavirus disease 2019).  During this jayson immunosuppressed status due to disease or medication, chronic respiratory or heart conditions and diabetes). ?  During the social distancing period imposed by the [place] in March, any employee who can be isolated at home and avoid exposure to th

## (undated) NOTE — LETTER
10/20/20        [address]  [address]      [address] [place] [address] registros indican que tiene análisis clínicos pendientes y/o pruebas que se ordenaron en maddox nombre que no se harper completado.     Para brindarle la mejor a

## (undated) NOTE — LETTER
01/21/21        Merlin Matthew  [address]      Dear Aurelio Schaumann,    Our records indicate that you have outstanding lab work and or testing that was ordered for you and has not yet been completed:  Orders Placed This Encounter      [place]